# Patient Record
Sex: FEMALE | Race: WHITE | NOT HISPANIC OR LATINO | Employment: FULL TIME | URBAN - METROPOLITAN AREA
[De-identification: names, ages, dates, MRNs, and addresses within clinical notes are randomized per-mention and may not be internally consistent; named-entity substitution may affect disease eponyms.]

---

## 2021-08-18 ENCOUNTER — HOSPITAL ENCOUNTER (EMERGENCY)
Facility: HOSPITAL | Age: 26
Discharge: HOME | End: 2021-08-18
Attending: EMERGENCY MEDICINE | Admitting: EMERGENCY MEDICINE
Payer: COMMERCIAL

## 2021-08-18 VITALS
WEIGHT: 110 LBS | HEART RATE: 84 BPM | OXYGEN SATURATION: 97 % | RESPIRATION RATE: 18 BRPM | DIASTOLIC BLOOD PRESSURE: 86 MMHG | TEMPERATURE: 98 F | SYSTOLIC BLOOD PRESSURE: 138 MMHG | BODY MASS INDEX: 17.23 KG/M2

## 2021-08-18 DIAGNOSIS — S61.213A LACERATION OF MIDDLE FINGER OF LEFT HAND WITHOUT COMPLICATION, INITIAL ENCOUNTER: ICD-10-CM

## 2021-08-18 PROCEDURE — 12001 RPR S/N/AX/GEN/TRNK 2.5CM/<: CPT

## 2021-08-18 PROCEDURE — 99283 EMERGENCY DEPT VISIT LOW MDM: CPT

## 2021-08-19 NOTE — ED NOTES
RN agrees with triage note. Denies other injuries. See provider notes for further assessment details.

## 2021-08-19 NOTE — ED PROVIDER NOTES
EMERGENCY DEPARTMENT ENCOUNTER      NAME: Britt Guevara  AGE: 25 year old female  YOB: 1995  MRN: [id number]  EVALUATION DATE & TIME: 8/18/2021  9:26 PM    PCP: Romi Cerna    ED PROVIDER: Valeyr Luo MD    Chief Complaint   Patient presents with     Laceration         FINAL IMPRESSION:  1. Laceration of middle finger of left hand without complication, initial encounter          ED COURSE & MEDICAL DECISION MAKING:    Pertinent Labs & Imaging studies reviewed. 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  Laceration does gape open and warrants repair.  Laceration repair performed, please see procedure note.  Immunizations up-to-date.  Discharged home.           9:32 PM I met the patient and performed my initial interview and exam.    9:50 PM Performed a laceration repair.    At the conclusion of the encounter I discussed the results of all of the tests and the disposition. The questions were answered. The patient or family acknowledged understanding and was agreeable with the care plan.        MEDICATIONS GIVEN IN THE EMERGENCY:  Medications - No data to display    NEW PRESCRIPTIONS STARTED AT TODAY'S ER VISIT  There are no discharge medications for this patient.         =================================================================    HPI    Patient information was obtained from: Patient    Use of Intrepreter: N/A       Britt Guevara is a 25 year old female with no pertinent medical history who presents to this ED by car for evaluation of laceration.    Patient reports that she was cutting carrots when she cut her left middle finger towards the tip of her finger. She is right hand dominant, physical therapist. Her tetanus is up to date and she has not scrubbed it yet. She denies any other  complaints at this time.       REVIEW OF SYSTEMS  Constitutional:  Denies fever, chills, weight loss or weakness  Musculoskeletal:  Denies any new muscle/joint pain, swelling or loss of function.  Skin: Positive for left middle finger laceration. Denies rash, pallor  Neurologic:  Denies headache, focal weakness or sensory changes    PAST MEDICAL HISTORY:  History reviewed. No pertinent past medical history.    PAST SURGICAL HISTORY:  History reviewed. No pertinent surgical history.    CURRENT MEDICATIONS:    None       ALLERGIES:  No Known Allergies    FAMILY HISTORY:  History reviewed. No pertinent family history.    SOCIAL HISTORY:  Social History     Tobacco Use     Smoking status: Never Smoker     Smokeless tobacco: Never Used     Tobacco comment: No exposure   Substance Use Topics     Alcohol use: No     Drug use: No        VITALS:  Patient Vitals for the past 24 hrs:   BP Temp Temp src Pulse Resp SpO2 Weight   08/18/21 2019 138/86 98  F (36.7  C) Oral 84 18 97 % 49.9 kg (110 lb)       PHYSICAL EXAM    General Appearance: Well-appearing, well-nourished, no acute distress  Head:  Normocephalic  Eyes:   conjunctiva/corneas clear  Cardio:  Regular rate and rhythm  Pulm:  No respiratory distress  Extremities: U-shaped 1.5cm laceration, involves 2mm of the nail.  Intrinsic hand movements intact  Skin:  Skin warm, dry, no rashes  Neuro:  Alert and oriented ×3, moving all extremities, no gross sensory defects            PROCEDURES:  PROCEDURE: Laceration Repair   INDICATIONS: Laceration   PROCEDURE PROVIDER: Valery Luo   SITE: Left middle finger   TYPE/SIZE: simple, clean and no foreign body visualized  1.5 cm (total length)   FUNCTIONAL ASSESSMENT: Distal sensation, circulation and motor intact   MEDICATION: 1.5 mLs of 1% Lidocaine with epinephrine   PREPARATION: scrubbing with Warm soapy water   DEBRIDEMENT: no debridement   CLOSURE:  Wound was closed in   one layer: Skin closed with 2 interuptted stitches of 5-0  Ethilon     Total number of sutures/staples placed: 2           The creation of this record is based on the scribe s observations of the work being performed by Valery Luo MD and the provider s statements to them. It was created on his behalf by Jaron Sánchez, a trained medical scribe. This document has been checked and approved by the attending provider.    Valery Luo MD  Emergency Medicine  [facility] EMERGENCY DEPARTMENT  [address] [place] [address] [id number] [phone]  Dept: [phone]     Valery Luo MD  08/19/21 0019

## 2021-08-19 NOTE — ED TRIAGE NOTES
Pt was cutting carrots with new knife and slipped. Laceration to middle left finger. Bleeding controlled at this time.

## 2021-08-21 ENCOUNTER — HEALTH MAINTENANCE LETTER (OUTPATIENT)
Age: 26
End: 2021-08-21

## 2021-10-16 ENCOUNTER — HEALTH MAINTENANCE LETTER (OUTPATIENT)
Age: 26
End: 2021-10-16

## 2022-09-25 ENCOUNTER — HEALTH MAINTENANCE LETTER (OUTPATIENT)
Age: 27
End: 2022-09-25

## 2023-04-07 ENCOUNTER — OFFICE VISIT (OUTPATIENT)
Dept: URGENT CARE | Facility: URGENT CARE | Age: 28
End: 2023-04-07
Payer: COMMERCIAL

## 2023-04-07 VITALS
TEMPERATURE: 98.9 F | DIASTOLIC BLOOD PRESSURE: 80 MMHG | OXYGEN SATURATION: 98 % | WEIGHT: 117.8 LBS | BODY MASS INDEX: 18.45 KG/M2 | SYSTOLIC BLOOD PRESSURE: 120 MMHG | HEART RATE: 85 BPM

## 2023-04-07 DIAGNOSIS — N89.8 VAGINAL DISCHARGE: ICD-10-CM

## 2023-04-07 DIAGNOSIS — B96.89 BV (BACTERIAL VAGINOSIS): Primary | ICD-10-CM

## 2023-04-07 DIAGNOSIS — N76.0 BV (BACTERIAL VAGINOSIS): Primary | ICD-10-CM

## 2023-04-07 LAB
CLUE CELLS: PRESENT
TRICHOMONAS, WET PREP: ABNORMAL
WBC'S/HIGH POWER FIELD, WET PREP: ABNORMAL
YEAST, WET PREP: ABNORMAL

## 2023-04-07 PROCEDURE — 99204 OFFICE O/P NEW MOD 45 MIN: CPT | Performed by: PHYSICIAN ASSISTANT

## 2023-04-07 PROCEDURE — 87210 SMEAR WET MOUNT SALINE/INK: CPT | Performed by: PHYSICIAN ASSISTANT

## 2023-04-07 RX ORDER — METRONIDAZOLE 500 MG/1
500 TABLET ORAL 2 TIMES DAILY
Qty: 14 TABLET | Refills: 0 | Status: SHIPPED | OUTPATIENT
Start: 2023-04-07 | End: 2023-04-14

## 2023-04-07 ASSESSMENT — ENCOUNTER SYMPTOMS
VOMITING: 0
NECK STIFFNESS: 0
GASTROINTESTINAL NEGATIVE: 1
FLANK PAIN: 0
MUSCULOSKELETAL NEGATIVE: 1
ABDOMINAL PAIN: 0
NAUSEA: 0
HEMATURIA: 0
FEVER: 0
DYSURIA: 1
ENDOCRINE NEGATIVE: 1
NECK PAIN: 0
WEAKNESS: 0
PALPITATIONS: 0
HEADACHES: 0
COUGH: 0
ADENOPATHY: 0
LIGHT-HEADEDNESS: 0
MYALGIAS: 0
CONSTITUTIONAL NEGATIVE: 1
DIARRHEA: 0
ACTIVITY CHANGE: 0
FREQUENCY: 1
FATIGUE: 0
SORE THROAT: 0
POLYDIPSIA: 0
NEUROLOGICAL NEGATIVE: 1
CHILLS: 0
SHORTNESS OF BREATH: 0
DIZZINESS: 0
RHINORRHEA: 0
RESPIRATORY NEGATIVE: 1
CARDIOVASCULAR NEGATIVE: 1

## 2023-04-07 NOTE — PROGRESS NOTES
Chief Complaint:    Chief Complaint   Patient presents with     Urgent Care     Vaginal Problem     Think have BV, sx started 3 days ago      ASSESSMENT     1. BV (bacterial vaginosis)    2. Vaginal discharge       PLAN    Wet prep was positive for clue cells  Rx for Flagyl today  Follow up with PCP in 1 week if symptoms are not improving.  Worrisome symptoms discussed with instructions to go to the ED.  Patient verbalized understanding and agreed with this plan.    Labs:     Results for orders placed or performed in visit on 04/07/23   Wet prep - lab collect     Status: Abnormal    Specimen: Vagina; Swab   Result Value Ref Range    Trichomonas Absent Absent    Yeast Absent Absent    Clue Cells Present (A) Absent    WBCs/high power field 2+ (A) None       Problem history    There is no problem list on file for this patient.      Current Meds    Current Outpatient Medications:      metroNIDAZOLE (FLAGYL) 500 MG tablet, Take 1 tablet (500 mg) by mouth 2 times daily for 7 days, Disp: 14 tablet, Rfl: 0    Allergies  No Known Allergies    SUBJECTIVE    HPI:  Britt Guevara is a 27 year old female who has symptoms of vaginal discharge and odor for 2 day(s).  she denies back pain, nausea, vomiting, fever and chills, flank pain.    Patient is new to [facility].    ROS:      Review of Systems   Constitutional: Negative.  Negative for activity change, chills, fatigue and fever.   HENT: Negative for congestion, ear pain, rhinorrhea and sore throat.    Respiratory: Negative.  Negative for cough and shortness of breath.    Cardiovascular: Negative.  Negative for chest pain and palpitations.   Gastrointestinal: Negative.  Negative for abdominal pain, diarrhea, nausea and vomiting.   Endocrine: Negative.  Negative for polydipsia and polyuria.   Genitourinary: Positive for dysuria, frequency and urgency. Negative for flank pain, hematuria, pelvic pain, vaginal discharge and vaginal pain.   Musculoskeletal: Negative.  Negative  for myalgias, neck pain and neck stiffness.   Allergic/Immunologic: Negative for immunocompromised state.   Neurological: Negative.  Negative for dizziness, weakness, light-headedness and headaches.   Hematological: Negative for adenopathy.       Family History   History reviewed. No pertinent family history.     Social History  Social History     Socioeconomic History     Marital status: Single     Spouse name: Not on file     Number of children: 0     Years of education: Not on file     Highest education level: Not on file   Occupational History     Not on file   Tobacco Use     Smoking status: Never     Smokeless tobacco: Never     Tobacco comments:     No exposure   Vaping Use     Vaping status: Not on file   Substance and Sexual Activity     Alcohol use: No     Drug use: No     Sexual activity: Never   Other Topics Concern     Not on file   Social History Narrative    Attending [employer] for physical therapy.  Working at [employer] in [place].     Social Determinants of Health     Financial Resource Strain: Not on file   Food Insecurity: Not on file   Transportation Needs: Not on file   Physical Activity: Not on file   Stress: Not on file   Social Connections: Not on file   Intimate Partner Violence: Not on file   Housing Stability: Not on file           OBJECTIVE     Vital signs noted and reviewed by Tanner Tristan PA-C  /80 (BP Location: Left arm, Patient Position: Sitting, Cuff Size: Adult Regular)   Pulse 85   Temp 98.9  F (37.2  C) (Tympanic)   Wt 53.4 kg (117 lb 12.8 oz)   LMP 03/31/2023 (Approximate)   SpO2 98%   BMI 18.45 kg/m       Physical Exam  Vitals and nursing note reviewed.   Constitutional:       General: She is not in acute distress.     Appearance: Normal appearance. She is well-developed. She is not ill-appearing, toxic-appearing or diaphoretic.   HENT:      Head: Normocephalic and atraumatic.      Right Ear: Tympanic membrane and  external ear normal.      Left Ear: Tympanic membrane and external ear normal.   Eyes:      Pupils: Pupils are equal, round, and reactive to light.   Cardiovascular:      Rate and Rhythm: Normal rate and regular rhythm.      Heart sounds: Normal heart sounds. No murmur heard.     No friction rub. No gallop.   Pulmonary:      Effort: Pulmonary effort is normal. No respiratory distress.      Breath sounds: Normal breath sounds. No wheezing or rales.   Chest:      Chest wall: No tenderness.   Abdominal:      General: Bowel sounds are normal. There is no distension.      Palpations: Abdomen is soft. Abdomen is not rigid. There is no mass.      Tenderness: There is no abdominal tenderness. There is no guarding or rebound. Negative signs include Leonard's sign and McBurney's sign.   Musculoskeletal:      Cervical back: Normal range of motion and neck supple.   Lymphadenopathy:      Cervical: No cervical adenopathy.   Skin:     General: Skin is warm and dry.   Neurological:      Mental Status: She is alert and oriented to person, place, and time.      Cranial Nerves: No cranial nerve deficit.      Deep Tendon Reflexes: Reflexes are normal and symmetric.   Psychiatric:         Behavior: Behavior normal. Behavior is cooperative.         Thought Content: Thought content normal.         Judgment: Judgment normal.             Tanner Tristan PA-C  4/7/2023, 3:05 PM

## 2023-05-29 ENCOUNTER — OFFICE VISIT (OUTPATIENT)
Dept: FAMILY MEDICINE | Facility: CLINIC | Age: 28
End: 2023-05-29
Payer: COMMERCIAL

## 2023-05-29 ENCOUNTER — HOSPITAL ENCOUNTER (OUTPATIENT)
Dept: GENERAL RADIOLOGY | Facility: HOSPITAL | Age: 28
Discharge: HOME | End: 2023-05-29
Attending: FAMILY MEDICINE | Admitting: FAMILY MEDICINE
Payer: COMMERCIAL

## 2023-05-29 VITALS
SYSTOLIC BLOOD PRESSURE: 119 MMHG | BODY MASS INDEX: 18.51 KG/M2 | TEMPERATURE: 97.7 F | OXYGEN SATURATION: 99 % | DIASTOLIC BLOOD PRESSURE: 75 MMHG | RESPIRATION RATE: 16 BRPM | WEIGHT: 118.2 LBS | HEART RATE: 76 BPM

## 2023-05-29 DIAGNOSIS — S69.91XA INJURY OF RIGHT THUMB, INITIAL ENCOUNTER: Primary | ICD-10-CM

## 2023-05-29 DIAGNOSIS — S69.91XA INJURY OF RIGHT THUMB, INITIAL ENCOUNTER: ICD-10-CM

## 2023-05-29 PROCEDURE — 73140 X-RAY EXAM OF FINGER(S): CPT | Mod: RT

## 2023-05-29 PROCEDURE — 99203 OFFICE O/P NEW LOW 30 MIN: CPT | Performed by: FAMILY MEDICINE

## 2023-05-29 NOTE — PROGRESS NOTES
ASSESSMENT/PLAN:      ICD-10-CM    1. Injury of right thumb, initial encounter  S69.91XA XR Finger Right G/E 2 Views     Orthopedic  Referral                  Patient Instructions   Call the ortho  service at the number noted and request to be seen in next 1-2 days         Follow up as needed or if your symptoms worsen in any way.     Follow up with your primary care provider or clinic in about 2-3 days  if your symptoms do not improve          Reviewed medication instructions and side effects. Follow up if experiences side effects.     I reviewed supportive care, otc meds to use if needed, expected course, and signs of concern.  Follow up as needed or if she does not improve within  1-2 days or if worsens in any way.  Reviewed red flag symptoms and is to go to the ER if experiences any of these.     The use of Dragon/Quotations Book dictation services may have been used to construct the content in this note; any grammatical or spelling errors are non-intentional. Please contact the author of this note directly if you are in need of any clarification.                          Patient presents with:  Musculoskeletal Problem: Possible sprain of right thumb       Subjective     Britt Guevara is a 27 year old female who presents to clinic today for the following health issues:    HPI   Hit golf club into ground now has bruising and pain at the base of her right thumb  Patient is a physical therapist      No past medical history on file.  Social History     Tobacco Use     Smoking status: Never     Smokeless tobacco: Never     Tobacco comments:     No exposure   Substance Use Topics     Alcohol use: No       No current outpatient medications on file.     Allergies   Allergen Reactions     Cats      Other reaction(s): Rhinitis             ROS are negative, except as otherwise noted HPI      Objective    /75   Pulse 76   Temp 97.7  F (36.5  C) (Oral)   Resp 16   Wt 53.6 kg (118 lb 3.2 oz)    LMP 03/31/2023 (Approximate)   SpO2 99%   BMI 18.51 kg/m    Body mass index is 18.51 kg/m .  Physical Exam   GENERAL: healthy, alert and no distress  MS: right hand-thumb swelling, tender mild ecchymosis over the MCP and CMC, unable to assess ligamentous injury due to swelling/pain  NEURO: Normal strength and tone, mentation intact and speech normal    Diagnostic Test Results:  Labs reviewed in Epic  Results for orders placed or performed during the hospital encounter of 05/29/23   XR Finger Right G/E 2 Views     Status: None    Narrative    EXAM: XR FINGER RIGHT G/E 2 VIEWS  LOCATION: [facility]  DATE/TIME: 5/29/2023 10:04 AM CDT    INDICATION:  Injury of right thumb, initial encounter  COMPARISON: None.      Impression    IMPRESSION: Normal joint spaces and alignment. No fracture.

## 2023-05-29 NOTE — PATIENT INSTRUCTIONS
Call the ortho  service at the number noted and request to be seen in next 1-2 days         Follow up as needed or if your symptoms worsen in any way.     Follow up with your primary care provider or clinic in about 2-3 days  if your symptoms do not improve

## 2023-05-30 ENCOUNTER — TELEPHONE (OUTPATIENT)
Dept: ORTHOPEDICS | Facility: CLINIC | Age: 28
End: 2023-05-30
Payer: COMMERCIAL

## 2023-05-30 NOTE — TELEPHONE ENCOUNTER
[facility] Call Center    Phone Message    May a detailed message be left on voicemail: yes     Reason for Call Patient has Mamta Order. She want to go to . Please assist with Sooner Appt Action Taken: Message routed to:  [facility]): romel    Travel Screening: Not Applicable

## 2023-05-30 NOTE — TELEPHONE ENCOUNTER
DIAGNOSIS: Injury of right thumb, initial encounter [S69.91XA]   APPOINTMENT DATE: 06/15/2023   NOTES STATUS DETAILS   OFFICE NOTE from referring provider Internal 05/29/2023 Dr Almodovar [facility]    OFFICE NOTE from other specialist N/A    DISCHARGE SUMMARY from hospital N/A    DISCHARGE REPORT from the ER N/A    OPERATIVE REPORT N/A    EMG report N/A    MEDICATION LIST N/A    MRI N/A    DEXA (osteoporosis/bone health) N/A    CT SCAN N/A    XRAYS (IMAGES & REPORTS) Internal 05/29/2023 RT finger

## 2023-05-30 NOTE — TELEPHONE ENCOUNTER
Left message for patient regarding an earlier appointment with Sr. Villaseñor. I let her know that I have a few spots available on 6/1 and 6/2 with Dr. Villaseñor. I left a number for call back to schedule.    MICHEAL Cortez NREMT

## 2023-06-01 ENCOUNTER — OFFICE VISIT (OUTPATIENT)
Dept: ORTHOPEDICS | Facility: CLINIC | Age: 28
End: 2023-06-01
Attending: FAMILY MEDICINE
Payer: COMMERCIAL

## 2023-06-01 DIAGNOSIS — S69.91XA INJURY OF RIGHT THUMB, INITIAL ENCOUNTER: ICD-10-CM

## 2023-06-01 PROCEDURE — 99203 OFFICE O/P NEW LOW 30 MIN: CPT | Performed by: FAMILY MEDICINE

## 2023-06-01 ASSESSMENT — PAIN SCALES - GENERAL: PAINLEVEL: SEVERE PAIN (6)

## 2023-06-01 NOTE — LETTER
6/1/2023         RE: Britt Guevara  [address]  [facility] [place] [id number]        Dear Colleague,    Thank you for referring your patient, Britt Guevara, to the [facility] [place]. Please see a copy of my visit note below.    CHIEF COMPLAINT:  Pain and New Patient of the Right Hand (Right thumb)       HISTORY OF PRESENT ILLNESS  Ms. Guevara is a pleasant 27 year old female who presents to clinic today with a right thumb injury.  Britt was playing golf Sunday when she struck the ground with her club on an air and golf swing.  She felt an immediate pain at her MCP joint in her right thumb.  She also noticed some laxity.  She works as a physical therapist.    She was seen at a local urgent care facility where she had a x-ray, this was reassuring.  She was placed into a thumb spica splint.        Additional history: as documented    MEDICAL HISTORY  There is no problem list on file for this patient.      No current outpatient medications on file.       Allergies   Allergen Reactions     Cats      Other reaction(s): Rhinitis       No family history on file.    Additional medical/Social/Surgical histories reviewed in [facility] and updated as appropriate.        PHYSICAL EXAM  General  - normal appearance, in no obvious distress    Musculoskeletal - right thumb  - inspection: no atrophy, normal joint alignment, no swelling  - palpation: no CMC tenderness, tender radial MCP  - ROM:  MCP 70 deg flexion   0 deg extension   IP 90 deg flexion   0 deg extension  - strength: 5/5 thumb flexion, extension  - special tests:  Marked laxity with radial collateral ligament testing  Neuro  - no numbness, no motor deficit, grossly normal coordination, normal muscle tone  Skin  - no ecchymosis, erythema, warmth, or induration, no obvious rash               ASSESSMENT & PLAN  Ms. Guevara is a 27 year old female who presents to clinic today with a left thumb injury.    I reviewed her x-ray in the room  with her, this shows no obvious fracture and no Stener lesion.    Her laxity is marked, given her degree of injury and her profession I am ordering an MRI.  I am going to get in touch with her with the results.    In the meantime she should continue to wear her thumb spica at most times that she is able.    It was a pleasure seeing Britt today.    Roland Villaseñor DO, CAM  Primary Care Sports Medicine      This note was constructed using Dragon dictation software, please excuse any minor errors in spelling, grammar, or syntax.      Again, thank you for allowing me to participate in the care of your patient.        Sincerely,        Roland Villaseñor DO

## 2023-06-01 NOTE — NURSING NOTE
Chief Complaint   Patient presents with     Right Hand - Pain, New Patient     Right thumb       There were no vitals filed for this visit.    There is no height or weight on file to calculate BMI.      MICHEAL Cortez NREMT

## 2023-06-01 NOTE — PROGRESS NOTES
CHIEF COMPLAINT:  Pain and New Patient of the Right Hand (Right thumb)       HISTORY OF PRESENT ILLNESS  Ms. Guevara is a pleasant 27 year old female who presents to clinic today with a right thumb injury.  Britt was playing golf Sunday when she struck the ground with her club on an air and golf swing.  She felt an immediate pain at her MCP joint in her right thumb.  She also noticed some laxity.  She works as a physical therapist.    She was seen at a local urgent care facility where she had a x-ray, this was reassuring.  She was placed into a thumb spica splint.        Additional history: as documented    MEDICAL HISTORY  There is no problem list on file for this patient.      No current outpatient medications on file.       Allergies   Allergen Reactions     Cats      Other reaction(s): Rhinitis       No family history on file.    Additional medical/Social/Surgical histories reviewed in [facility] and updated as appropriate.        PHYSICAL EXAM  General  - normal appearance, in no obvious distress    Musculoskeletal - right thumb  - inspection: no atrophy, normal joint alignment, no swelling  - palpation: no CMC tenderness, tender radial MCP  - ROM:  MCP 70 deg flexion   0 deg extension   IP 90 deg flexion   0 deg extension  - strength: 5/5 thumb flexion, extension  - special tests:  Marked laxity with radial collateral ligament testing  Neuro  - no numbness, no motor deficit, grossly normal coordination, normal muscle tone  Skin  - no ecchymosis, erythema, warmth, or induration, no obvious rash               ASSESSMENT & PLAN  Ms. Guevara is a 27 year old female who presents to clinic today with a left thumb injury.    I reviewed her x-ray in the room with her, this shows no obvious fracture and no Stener lesion.    Her laxity is marked, given her degree of injury and her profession I am ordering an MRI.  I am going to get in touch with her with the results.    In the meantime she should continue to wear her thumb spica  at most times that she is able.    It was a pleasure seeing Britt today.    Roland Villaseñor DO, [facility]  Primary Care Sports Medicine      This note was constructed using Dragon dictation software, please excuse any minor errors in spelling, grammar, or syntax.

## 2023-06-05 ENCOUNTER — HOSPITAL ENCOUNTER (OUTPATIENT)
Dept: MRI IMAGING | Facility: HOSPITAL | Age: 28
Discharge: HOME | End: 2023-06-05
Attending: FAMILY MEDICINE | Admitting: FAMILY MEDICINE
Payer: COMMERCIAL

## 2023-06-05 DIAGNOSIS — S69.91XA INJURY OF RIGHT THUMB, INITIAL ENCOUNTER: ICD-10-CM

## 2023-06-05 PROCEDURE — 73221 MRI JOINT UPR EXTREM W/O DYE: CPT | Mod: RT

## 2023-06-15 ENCOUNTER — PRE VISIT (OUTPATIENT)
Dept: ORTHOPEDICS | Facility: CLINIC | Age: 28
End: 2023-06-15

## 2023-10-14 ENCOUNTER — HEALTH MAINTENANCE LETTER (OUTPATIENT)
Age: 28
End: 2023-10-14

## 2024-02-14 RX ORDER — NITROFURANTOIN 25; 75 MG/1; MG/1
CAPSULE ORAL
COMMUNITY
Start: 2023-12-23

## 2024-02-15 ENCOUNTER — OFFICE VISIT (OUTPATIENT)
Dept: OBGYN | Facility: CLINIC | Age: 29
End: 2024-02-15
Payer: COMMERCIAL

## 2024-02-15 VITALS
DIASTOLIC BLOOD PRESSURE: 88 MMHG | HEART RATE: 100 BPM | OXYGEN SATURATION: 96 % | SYSTOLIC BLOOD PRESSURE: 138 MMHG | BODY MASS INDEX: 18.79 KG/M2 | WEIGHT: 120 LBS

## 2024-02-15 DIAGNOSIS — Z30.09 GENERAL COUNSELING FOR PRESCRIPTION OF ORAL CONTRACEPTIVES: ICD-10-CM

## 2024-02-15 DIAGNOSIS — Z01.419 ENCOUNTER FOR GYNECOLOGICAL EXAMINATION WITHOUT ABNORMAL FINDING: Primary | ICD-10-CM

## 2024-02-15 DIAGNOSIS — Z12.4 SCREENING FOR MALIGNANT NEOPLASM OF CERVIX: ICD-10-CM

## 2024-02-15 PROCEDURE — 99385 PREV VISIT NEW AGE 18-39: CPT | Performed by: OBSTETRICS & GYNECOLOGY

## 2024-02-15 PROCEDURE — G0145 SCR C/V CYTO,THINLAYER,RESCR: HCPCS | Performed by: OBSTETRICS & GYNECOLOGY

## 2024-02-15 RX ORDER — DESOGESTREL AND ETHINYL ESTRADIOL 0.15-0.03
1 KIT ORAL DAILY
Qty: 84 TABLET | Refills: 3 | Status: SHIPPED | OUTPATIENT
Start: 2024-02-15

## 2024-02-15 RX ORDER — SULFAMETHOXAZOLE/TRIMETHOPRIM 800-160 MG
TABLET ORAL
COMMUNITY
Start: 2024-01-16

## 2024-02-15 NOTE — PROGRESS NOTES
"GYN CLINIC  2/15/2024  CC: annual exam    HPI:  Britt is a 28 year old nulligravid. female who presents for annual exam.     Menses are regular q 28-30 days and normal lasting 5 days.  Menses flow: medium.  Patient's last menstrual period was 01/19/2024 (exact date).. Using none for contraception.  She is not currently considering pregnancy. Recently  and she is interested in starting an OCP. No personal or fam h/o VTE, no htn, no smoking, no liver disease, no migraines.Questions about IUDs.  Besides routine health maintenance, she has no other health concerns today .  GYNECOLOGIC HISTORY:  Menarche: 11  Number of lifetime partners: <6  Britt is sexually active with male partner(s) and is currently in monogamous relationship with .    History sexually transmitted infections:No STD history  STI testing offered?  Declined  ERIBERTO exposure: No  History of abnormal Pap smear: NO - age 21-29 PAP every 3 years recommended  Family history of breast CA: Yes (Please explain): Maternal grandmother  Family history of uterine/ovarian CA: No    Family history of colon CA: Yes (Please explain): Aunt on paternal side    HEALTH MAINTENANCE:  Cholesterol: (No results found for: \"CHOL\" History of abnormal lipids: No  Mammo: N/A . History of abnormal Mammo: Not applicable.  Regular Self Breast Exams: Yes  Calcium/Vitamin D intake: source:  dairy Adequate? Yes  TSH: (No results found for: \"TSH\" )  Pap; (No results found for: \"PAP\" )    HISTORY:  OB History   No obstetric history on file.     No past medical history on file.    No past surgical history on file.    No family history on file.    Social History   Recently , lives with   Works as a physical therapist  No tobaccco   4 drink / week  No drugs  Volleyball and softball for exercise  Social History     Tobacco Use     Smoking status: Never     Smokeless tobacco: Never     Tobacco comments:     No exposure   Substance Use Topics     Alcohol use: No     " Drug use: No           Current Outpatient Medications:      nitroFURantoin macrocrystal-monohydrate (MACROBID) 100 MG capsule, , Disp: , Rfl:      sulfamethoxazole-trimethoprim (BACTRIM DS) 800-160 MG tablet, , Disp: , Rfl:      Allergies   Allergen Reactions     Cats      Other reaction(s): Rhinitis       Past medical, surgical, social and family history were reviewed and updated in [facility].    ROS:   C:     NEGATIVE for fever, chills, change in weight  I:       NEGATIVE for worrisome rashes, moles or lesions  E:     NEGATIVE for vision changes or irritation  E/M: NEGATIVE for ear, mouth and throat problems  R:     NEGATIVE for significant cough or SOB  CV:   NEGATIVE for chest pain, palpitations or peripheral edema  GI:     NEGATIVE for nausea, abdominal pain, heartburn, or change in bowel habits  :   NEGATIVE for frequency, dysuria, hematuria, vaginal discharge, or irregular bleeding  M:     NEGATIVE for significant arthralgias or myalgia  N:      NEGATIVE for weakness, dizziness or paresthesias  E:      NEGATIVE for temperature intolerance, skin/hair changes  P:      NEGATIVE for changes in mood or affect.    EXAM:  /88   Pulse 100   Wt 54.4 kg (120 lb)   LMP 01/19/2024 (Exact Date)   SpO2 96%   BMI 18.79 kg/m     BMI: Body mass index is 18.79 kg/m .  Constitutional: healthy, alert and no distress  Head: Normocephalic. No masses, lesions, tenderness or abnormalities  Neck: Neck supple. Trachea midline. No adenopathy. Thyroid symmetric, normal size.   Cardiovascular: RRR.   Respiratory: clear bilaterally.   Breast: Breasts reveal mild symmetric fibrocystic densities, but there are no dominant, discrete, fixed or suspicious masses found.  Gastrointestinal: Abdomen soft, non-tender, non-distended. No masses, organomegaly.  :  Vulva:  No external lesions, normal female hair distribution, no inguinal adenopathy.    Urethra:  Midline, non-tender, well supported, no discharge  Vagina:  Moist, pink, no  abnormal discharge, no lesions  Cervix: normal, no lesions  Uterus:  Normal size , non-tender, freely mobile  Ovaries:  No masses appreciated, non-tender, mobile  Rectal Exam: deferred  Musculoskeletal: extremities normal  Skin: no suspicious lesions or rashes  Psychiatric: Affect appropriate, cooperative,mentation appears normal.     COUNSELING:   Reviewed preventive health counseling, as reflected in patient instructions       Regular exercise       Healthy diet/nutrition       Contraception       Family planning   reports that she has never smoked. She has never used smokeless tobacco.    Body mass index is 18.79 kg/m .    FRAX Risk Assessment    ASSESSMENT:  28 year old female with satisfactory annual exam  1. Encounter for gynecological examination without abnormal finding      2. General counseling for prescription of oral contraceptives    - desogestrel-ethinyl estradiol (APRI) 0.15-30 MG-MCG tablet; Take 1 tablet by mouth daily  Dispense: 84 tablet; Refill: 3    3. Screening for malignant neoplasm of cervix    - Pap screen reflex to HPV if ASCUS - recommend age 25 - 29  - HPV Hold (Lab Only)  Kathy Valenzuela MD

## 2024-02-19 LAB
BKR LAB AP GYN ADEQUACY: NORMAL
BKR LAB AP GYN INTERPRETATION: NORMAL
BKR LAB AP HPV REFLEX: NORMAL
BKR LAB AP LMP: NORMAL
BKR LAB AP PREVIOUS ABNORMAL: NORMAL
PATH REPORT.COMMENTS IMP SPEC: NORMAL
PATH REPORT.COMMENTS IMP SPEC: NORMAL
PATH REPORT.RELEVANT HX SPEC: NORMAL

## 2024-10-10 ENCOUNTER — MYC MEDICAL ADVICE (OUTPATIENT)
Dept: OBGYN | Facility: CLINIC | Age: 29
End: 2024-10-10
Payer: COMMERCIAL

## 2024-12-07 ENCOUNTER — MYC REFILL (OUTPATIENT)
Dept: OBGYN | Facility: CLINIC | Age: 29
End: 2024-12-07
Payer: COMMERCIAL

## 2024-12-07 DIAGNOSIS — Z30.09 GENERAL COUNSELING FOR PRESCRIPTION OF ORAL CONTRACEPTIVES: ICD-10-CM

## 2024-12-09 RX ORDER — DESOGESTREL AND ETHINYL ESTRADIOL 0.15-0.03
1 KIT ORAL DAILY
Qty: 84 TABLET | Refills: 0 | Status: SHIPPED | OUTPATIENT
Start: 2024-12-09